# Patient Record
Sex: MALE | Race: OTHER | NOT HISPANIC OR LATINO | Employment: OTHER | URBAN - METROPOLITAN AREA
[De-identification: names, ages, dates, MRNs, and addresses within clinical notes are randomized per-mention and may not be internally consistent; named-entity substitution may affect disease eponyms.]

---

## 2020-11-05 NOTE — PATIENT DISCUSSION
Patient understands condition, prognosis and need for follow up care. Patient/Caregiver provided printed discharge information.

## 2021-05-14 ENCOUNTER — NEW PATIENT COMPREHENSIVE (OUTPATIENT)
Dept: URBAN - METROPOLITAN AREA CLINIC 38 | Facility: CLINIC | Age: 61
End: 2021-05-14

## 2021-05-14 DIAGNOSIS — H52.203: ICD-10-CM

## 2021-05-14 DIAGNOSIS — H25.813: ICD-10-CM

## 2021-05-14 DIAGNOSIS — H52.03: ICD-10-CM

## 2021-05-14 DIAGNOSIS — H52.4: ICD-10-CM

## 2021-05-14 DIAGNOSIS — H04.123: ICD-10-CM

## 2021-05-14 PROCEDURE — 92015 DETERMINE REFRACTIVE STATE: CPT

## 2021-05-14 PROCEDURE — 99204 OFFICE O/P NEW MOD 45 MIN: CPT

## 2021-05-14 ASSESSMENT — KERATOMETRY
OD_AXISANGLE_DEGREES: 155
OD_K2POWER_DIOPTERS: 44.00
OD_AXISANGLE2_DEGREES: 65
OS_K1POWER_DIOPTERS: 41.75
OD_K1POWER_DIOPTERS: 44.25
OS_K2POWER_DIOPTERS: 42.00
OS_AXISANGLE2_DEGREES: 60
OS_AXISANGLE_DEGREES: 150

## 2021-05-14 ASSESSMENT — TONOMETRY
OS_IOP_MMHG: 13
OD_IOP_MMHG: 15

## 2021-05-14 ASSESSMENT — VISUAL ACUITY
OD_SC: 20/40
OS_CC: J4
OD_CC: J5
OS_SC: 20/60-1
OS_CC: 20/30-2
OD_CC: 20/25

## 2021-06-07 ENCOUNTER — REFRACTION ONLY (OUTPATIENT)
Dept: URBAN - METROPOLITAN AREA CLINIC 38 | Facility: CLINIC | Age: 61
End: 2021-06-07

## 2021-06-07 DIAGNOSIS — H52.203: ICD-10-CM

## 2021-06-07 DIAGNOSIS — H52.03: ICD-10-CM

## 2021-06-07 DIAGNOSIS — H52.4: ICD-10-CM

## 2021-06-07 PROCEDURE — 92015GRNC REFRACTION GLASSES RECHECK - NO CHARGE

## 2021-06-07 ASSESSMENT — VISUAL ACUITY
OD_CC: 20/20-1
OS_CC: J10
OS_CC: 20/20
OD_CC: J1

## 2021-06-07 ASSESSMENT — KERATOMETRY
OS_AXISANGLE2_DEGREES: 60
OS_AXISANGLE_DEGREES: 150
OS_K2POWER_DIOPTERS: 42.00
OD_K2POWER_DIOPTERS: 44.00
OS_K1POWER_DIOPTERS: 41.75
OD_K1POWER_DIOPTERS: 44.25
OD_AXISANGLE_DEGREES: 155
OD_AXISANGLE2_DEGREES: 65

## 2022-08-29 NOTE — PATIENT DISCUSSION
Monitor.  ed SS RD FF sheet given today call STAT if changes.  ed 1 month for [facility] and needs COMP exam at this time also.